# Patient Record
Sex: MALE | Race: BLACK OR AFRICAN AMERICAN | NOT HISPANIC OR LATINO | URBAN - METROPOLITAN AREA
[De-identification: names, ages, dates, MRNs, and addresses within clinical notes are randomized per-mention and may not be internally consistent; named-entity substitution may affect disease eponyms.]

---

## 2022-10-28 ENCOUNTER — HOSPITAL ENCOUNTER (OUTPATIENT)
Dept: RADIOLOGY | Facility: HOSPITAL | Age: 57
Discharge: HOME | End: 2022-10-28
Attending: PODIATRIST
Payer: OTHER GOVERNMENT

## 2022-10-28 ENCOUNTER — OFFICE VISIT (OUTPATIENT)
Dept: PODIATRY | Facility: CLINIC | Age: 57
End: 2022-10-28
Payer: OTHER GOVERNMENT

## 2022-10-28 VITALS
SYSTOLIC BLOOD PRESSURE: 136 MMHG | RESPIRATION RATE: 18 BRPM | HEART RATE: 75 BPM | WEIGHT: 138.5 LBS | BODY MASS INDEX: 22.26 KG/M2 | DIASTOLIC BLOOD PRESSURE: 84 MMHG | HEIGHT: 66 IN

## 2022-10-28 DIAGNOSIS — M77.32 INFERIOR CALCANEAL SPUR OF LEFT FOOT: ICD-10-CM

## 2022-10-28 DIAGNOSIS — M72.2 PLANTAR FASCIITIS, LEFT: Primary | ICD-10-CM

## 2022-10-28 DIAGNOSIS — M79.672 PAIN OF LEFT HEEL: ICD-10-CM

## 2022-10-28 PROCEDURE — 73630 XR FOOT COMPLETE 3 VIEW LEFT: ICD-10-PCS | Mod: 26,LT,, | Performed by: RADIOLOGY

## 2022-10-28 PROCEDURE — 99202 PR OFFICE/OUTPT VISIT, NEW, LEVL II, 15-29 MIN: ICD-10-PCS | Mod: S$PBB,,, | Performed by: PODIATRIST

## 2022-10-28 PROCEDURE — 73630 X-RAY EXAM OF FOOT: CPT | Mod: 26,LT,, | Performed by: RADIOLOGY

## 2022-10-28 PROCEDURE — 73630 X-RAY EXAM OF FOOT: CPT | Mod: TC,LT

## 2022-10-28 PROCEDURE — 99999 PR PBB SHADOW E&M-NEW PATIENT-LVL III: ICD-10-PCS | Mod: PBBFAC,,, | Performed by: PODIATRIST

## 2022-10-28 PROCEDURE — 99202 OFFICE O/P NEW SF 15 MIN: CPT | Mod: S$PBB,,, | Performed by: PODIATRIST

## 2022-10-28 PROCEDURE — 99203 OFFICE O/P NEW LOW 30 MIN: CPT | Mod: PBBFAC | Performed by: PODIATRIST

## 2022-10-28 PROCEDURE — 99999 PR PBB SHADOW E&M-NEW PATIENT-LVL III: CPT | Mod: PBBFAC,,, | Performed by: PODIATRIST

## 2022-10-28 RX ORDER — ERGOCALCIFEROL (VITAMIN D2) 200 MCG/ML
2000 DROPS ORAL DAILY
COMMUNITY

## 2022-10-28 RX ORDER — LISINOPRIL 10 MG/1
10 TABLET ORAL DAILY
COMMUNITY

## 2022-10-30 NOTE — PROGRESS NOTES
"Subjective:       Patient ID: Trav Zuniga is a 56 y.o. male.    Chief Complaint: Foot Pain and Heel Pain  Patient presents with complaint pain left heel x2 days.  He knows a different pair of shoes cause the pain, significant pain upon 1st steps and worsening through the day.  Has gotten pretty severe in a short period of time.  Denies injury.  Pain level 8/10    Past Medical History:   Diagnosis Date    Hypertension      History reviewed. No pertinent surgical history.  Family History   Problem Relation Age of Onset    Arthritis Mother     Arthritis Father     Diabetes Father     Hypertension Father      Social History     Socioeconomic History    Marital status: Single   Tobacco Use    Smoking status: Never    Smokeless tobacco: Never   Substance and Sexual Activity    Alcohol use: Not Currently    Drug use: Never    Sexual activity: Yes     Partners: Female       Current Outpatient Medications   Medication Sig Dispense Refill    ergocalciferol (DRISDOL) 200 mcg/mL (8,000 unit/mL) Drop Take 2,000 Units by mouth once daily.      lisinopriL 10 MG tablet Take 10 mg by mouth once daily.       No current facility-administered medications for this visit.     Review of patient's allergies indicates:  No Known Allergies    Review of Systems   HENT:  Negative for congestion.    Respiratory:  Negative for cough.    Cardiovascular:  Negative for leg swelling.   Musculoskeletal:  Negative for gait problem.     Objective:      Vitals:    10/28/22 0828   BP: 136/84   Pulse: 75   Resp: 18   Weight: 62.8 kg (138 lb 8 oz)   Height: 5' 6" (1.676 m)     Physical Exam  Vitals and nursing note reviewed.   Constitutional:       General: He is not in acute distress.  Cardiovascular:      Pulses:           Dorsalis pedis pulses are 2+ on the right side and 2+ on the left side.        Posterior tibial pulses are 2+ on the right side and 2+ on the left side.   Pulmonary:      Effort: Pulmonary effort is normal.   Musculoskeletal:    "      General: Swelling and tenderness present.      Right foot: Normal range of motion. Deformity (pes planus foot type) present.      Left foot: Normal range of motion. Deformity present.      Comments: Tense edematous painful left heel at the plantar fascial insertion   Feet:      Right foot:      Skin integrity: Skin integrity normal.      Left foot:      Skin integrity: Skin integrity normal.   Skin:     Capillary Refill: Capillary refill takes less than 2 seconds.   Neurological:      General: No focal deficit present.   Psychiatric:         Mood and Affect: Mood normal.         Behavior: Behavior normal.         Thought Content: Thought content normal.         Judgment: Judgment normal.                      EXAMINATION:  XR FOOT COMPLETE 3 VIEW LEFT     CLINICAL HISTORY:  . Pain in left foot     TECHNIQUE:  AP, lateral, and oblique views of the left foot were performed.     COMPARISON:  None     FINDINGS:  No acute fracture or dislocation.  No significant soft tissue swelling.     The joint spaces are preserved.  The tarsal bones are normal in appearance.  Normal tarsometatarsal alignment.  Small plantar calcaneal spur.     Impression:     Small calcaneal spur.        Electronically signed by: Satnam Redd  Date:                                            10/28/2022    Assessment:       1. Plantar fasciitis, left    2. Pain of left heel    3. Inferior calcaneal spur of left foot          Plan:           X-RAYS COMPLETE LEFT FOOT    Reviewed x-rays with patient noting a small spur on the bottom of the left heel, advised patient this has been present for a long time and is not the cause of his pain but at times it may be felt due to the amount of inflammation under the heel  Reviewed flatfoot deformity  Reviewed plantar fasciitis at length with patient.   Explained this is a acute strain/sprain of the supportive band through the arch on the bottom of the foot due to recent shoes  Explained plantar fascia needs  to be supported properly along with treatment for inflammation. Explained bracing of the plantar fascia through appropriate arch support is crucial.  We discussed appropriate arch support and appropriate tennis shoes, how to gradually adjust to wearing arch supports comfortably, always removing insoles first to accommodate supports.  Advised once comfortable should be worn at all times of weight-bearing until pain resolves, then any time for work if possible, excessive activity, exercise, prolonged walking or standing.   Reviewed appropriate shoes for indoors, no flat shoes or walking barefoot.    Reviewed stretching   Reviewed ice/cool therapy and frequency this should be performed.  Reviewed topical and oral anti-inflammatory /NSAID therapy  Advised conservative treatments are most effective if all utilized together for typically a 2 week period of time  Briefly discussed cortisone injection if needed  Patient was in understanding and agreement with treatment plan.  I counseled the patient on their conditions, implications and medical management.  Instructed patient/family to contact the office with any changes, questions, concerns, worsening of symptoms.   Total face to face time, exam, assessment, treatment, discussion, documentation 20 minutes, more than half this time spent on consultation and coordination of care.   Follow up if not significant improvement in 1 week or not resolved in 2 weeks    This note was created using [employer] voice recognition software that occasionally misinterpreted phrases or words.